# Patient Record
Sex: MALE | Race: WHITE
[De-identification: names, ages, dates, MRNs, and addresses within clinical notes are randomized per-mention and may not be internally consistent; named-entity substitution may affect disease eponyms.]

---

## 2017-08-21 ENCOUNTER — HOSPITAL ENCOUNTER (OUTPATIENT)
Dept: HOSPITAL 25 - OREAST | Age: 67
Discharge: HOME | End: 2017-08-21
Attending: ORTHOPAEDIC SURGERY
Payer: MEDICARE

## 2017-08-21 VITALS — DIASTOLIC BLOOD PRESSURE: 77 MMHG | SYSTOLIC BLOOD PRESSURE: 125 MMHG

## 2017-08-21 DIAGNOSIS — M23.201: ICD-10-CM

## 2017-08-21 DIAGNOSIS — Z85.46: ICD-10-CM

## 2017-08-21 DIAGNOSIS — M65.852: ICD-10-CM

## 2017-08-21 DIAGNOSIS — I10: ICD-10-CM

## 2017-08-21 DIAGNOSIS — M23.204: Primary | ICD-10-CM

## 2017-08-21 DIAGNOSIS — M19.90: ICD-10-CM

## 2017-08-21 NOTE — OP
DATE OF OPERATION:  08/21/17 - [facility]

 

DATE OF BIRTH:  [birth year].

 

SURGEON:  Kimberly Tinoco MD.

 

ASSISTANTS:  PETR Biswas.  Assistant was needed for the entirety of 
the case to help with positioning, retraction, and was utilized throughout all 
portions of the case.

 

ANESTHESIOLOGIST:  Dr. Ambrose.

 

ANESTHESIA:  General.

 

PRE-OP DIAGNOSIS:  Left knee medial meniscus tear.

 

POST-OP DIAGNOSIS:  Left knee medial meniscus tear as well as lateral meniscus 
tear and synovitis anteriorly, medially, and laterally.

 

OPERATIVE PROCEDURE:  Left knee arthroscopy with:

1.  Partial medial meniscectomy. cpt 85186

2.  Partial lateral meniscectomy. cpt 24456

3.  Medial femoral condyle chondroplasty. cpt 01962

4.  Synovectomy with plica excision of the anteromedial and anterolateral 
compartments.cpt 23811

5.  Intraarticular injection of 80 mg of Depo-Medrol.

 

COMPLICATIONS:  None.

 

ESTIMATED BLOOD LOSS:  Minimal.

 

INDICATIONS:  Junior Garcia is a 67-year-old gentleman who has had left knee 
pain for several months.  He has failed conservative management including 
physical therapy and injection.  He had an MRI recently that demonstrated a 
displaced medial meniscus tear.  After extensive discussion of the risks and 
benefits of operative versus nonoperative treatment and having inability of 
returning to his activities of daily living, he elected to proceed with knee 
arthroscopy with meniscus surgery. Risks and benefits were discussed at length 
and included, but are not limited to bleeding, infection, damage to nerves, 
vessels, surrounding structures, wound nonhealing, persistent pain, need for 
further surgery, risk of anesthesia, scarring, persistent pain, stiffness, 
incomplete relief of symptoms, need for further surgery, risk of DVT, and risk 
of anesthesia.

 

DESCRIPTION OF PROCEDURE:  The patient was greeted in the preoperative area by 
the attending surgeon.  The correct extremity was marked and consent was 
confirmed. The patient was brought back to the operative suite where he was 
placed in supine position on operating table.  He then underwent general 
anesthesia with LMA intubation and an unsterile tourniquet was placed high on 
the proximal thigh.  The lateral post was positioned.  The left leg was then 
prepped and draped in usual sterile fashion beginning with chlorhexidine soap 
scrub, and alcohol wipe, and a final prep with ChloraPrep.

 

After appropriate surgical pause indicating side, site, procedure, and 
administration of antibiotics, the standard james-lateral portal was made 
sharply with an 11 blade.  The scope was introduced through the joint.  The 
joint was examined.  There was abundant hyperemia and erythematous tissue 
present.  There was plicae along the medial aspect as well as laterally; they 
were very hyperemic.  The patellofemoral joint had grade 0 to 1 changes with no 
unstable flaps.  The medial femoral condyle had small areas of grade 1 to 2 
changes with small areas of mild fraying.  The scope was brought into the 
notch.  The ACL and PCL were visualized and had abundant erythema and 
inflammation.  The electrocautery device was used to help perform a synovectomy 
of the medial and lateral compartments.  Once the excess synovium was removed, 
attention was directed to the medial compartment.  There was again a small area 
of grade 1 to 2 changes, but the vast majority of the medial femoral condyle 
had grade 1 changes.  Medial tibial plateau had grade 1 changes. There was an 
unstable parrot beak tear of the medial meniscus apparent that was unstable.  
This was then debrided back using biters and darlyn with care try to protect 
the medial femoral condyle cartilage.  Once this was removed, attention was 
directed to the lateral compartment where there was mild fraying of the body of 
the meniscus.  This was debrided back using the shaver.  The lateral femoral 
condyle had grade 1 changes to the plateau and lateral femoral condyle.  The 
knee was then placed in full extension.  The remainder of the synovectomy was 
done even changing portals, so that there was less friction in the lateral 
femoral condyles.  The shaving device as well as electrocautery device was used 
to remove the excess bands of soft tissue.  Electrocautery was used to obtain 
hemostasis.  All fluid and debris was removed from the knee.  The knee was 
cycled several times and thoroughly lavaged.  Final images were obtained.  The 
wounds were then closed with 3-0 nylon. Sterile dressings were applied, 80 mg 
of Depo-Medrol and 30 mL of 0.25% of Marcaine plain was then injected 
intraarticularly into the knee.  A Cryo/Cuff was placed. He was awoken from 
anesthesia and transferred to PACU in stable condition.

 

POSTOPERATIVE PLAN:  He will be weightbearing as tolerated.  He will be allowed 
to work on range of motion immediately.  He will be discharged on pain 
medication.  He is unable to take antiinflammatories due to his GERD and so we 
will monitor him closely.  I will see him back in 10 to 14 days.

 

 [id number]/CPS #: [id number]

[facility]

## 2018-02-26 NOTE — HP
HISTORY AND PHYSICAL:

 

DATE OF ADMISSION:  03/01/18

 

DATE OF SURGERY:  03/01/18

 

DATE OF H AND P:  02/23/18

 

SURGEON:  Mary Jane King MD * (dictated by PETR Jimenez)

 

PROCEDURE:  Left total knee replacement.

 

CHIEF COMPLAINT:  Left knee pain.

 

HISTORY OF PRESENT ILLNESS:  Mr. Garcia is a 67-year-old gentleman with over 6 
months of severe left knee pain.  Six months ago, he underwent an arthroscopy 
with Dr. Tinoco for a meniscal tear and did well for a few weeks before 
developing increasing medial joint pain.  He did have an MRI at his last visit, 
which showed a large osteochondral defect of the medial femoral condyle.  This 
was discussed in his last appointment, it is concerning that the joint line 
breakdown rapidly over the next 6 to 12 months.  The patient is also unhappy 
with this current quality of life and chronic pain with chronic swelling and 
inability to participate in his hobbies and usual activities.  He has failed 
conservative treatment with anti- inflammatories, pain medications, intra-
articular injection, and physical therapy and would like to proceed with a left 
total knee replacement and presents today for an H and P of the same.

 

He did have preoperative clearance from his primary care physician.  He is also 
having a stress test with his cardiologist, Dr. Lee, on 02/26/18 prior to his 
surgery and will be seen on 02/27/18 by cardiology to discuss the results.

 

PAST MEDICAL HISTORY:  Significant for left knee arthritis and osteochondritis, 
left knee osteoarthritis, abnormal EKG history, spinal stenosis of the lumbar 
spine, hypertension, cardiomyopathy, prostate cancer, left bundle branch block.
  He denies any history of DVT or PE.

 

PAST SURGICAL HISTORY:  Significant for prostate surgery in 2003, lumbar spine 
surgery in 2016, left knee arthroscopy in August 2017.

 

MEDICATIONS:  He is currently taking metoprolol 50 mg daily losartan 15 mg 
daily.

 

ALLERGIES:

1.  LISINOPRIL.

2.  ALTACE.

 

FAMILY MEDICAL HISTORY:  Significant for a brother with lung cancer and a 
sister with brain cancer.  Father with prostate cancer and mother with a stroke.

 

SOCIAL HISTORY:  The patient lives with his wife.  He is retired  
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  He denies any tobacco or recreational 
drug use.  He drinks about 4 alcoholic beverages per week.  He is normally very 
active and likes to play golf.  He is right-hand dominant.

 

REVIEW OF SYSTEMS:  Positive for his presenting complaint.  It is negative for 
fevers, chills, chest pain, shortness of breath, nausea, vomiting, headache, 
dizziness as well as urinary urgency, palpitations.  A 14-point review of 
systems was negative.

 

                               PHYSICAL EXAMINATION

 

GENERAL:  The patient is a well-nourished, well-developed male, in no acute 
distress.  Alert and oriented x3 with pleasant mood and normal affect.  He has 
an antalgic gait favoring the left knee.

 

VITAL SIGNS:  Height 72 inches, pulse 62, blood pressure 138/82, respirations 16
, temperature 97.5.  Pain level 4/10.

 

HEENT:  Normocephalic, atraumatic.  Pupils are equally round and reactive to 
light and accommodation.  Extraocular movements are intact.

 

NECK:  Supple.  No palpable cervical lymph nodes.  Thyroid is smooth and 
nontender.

 

PULMONARY:  Lungs clear to auscultation bilaterally with no wheezes, rales, or 
rhonchi.

 

CARDIAC:  Regular rate and rhythm.  I did not appreciate any murmurs, rubs, or 
gallops.  He had no pedal edema.  2+ DP pulses bilaterally.

 

EXTREMITIES:  Left lower extremity, the patient's skin is intact.  No abrasions 
or open wound.  Moderate effusion at the knee joint.  Tenderness along the 
medial joint line of the knee.  No varus or valgus pain or instability.  Range 
of motion is from 10 to 120 degrees of flexion.  Negative Lachman, 5/5 ankle 
dorsiflexion and plantar flexion strength.  Full sensation to light touch in 
all nerve distributions, 2+ palpable DP pulse.

 

 STUDIES:  No new studies were collected today.  Previous x-ray showed 
degenerative changes with some joint space narrowing.  Subchondral sclerosis 
and mildly evident OCD along the medial femoral condyle, degenerative changes 
in the medial and patellofemoral compartments.  The MRI again showed an OCD to 
the effect of the medial femoral condyle.  Medial joint space and 
patellofemoral joint space showed degenerative changes.

 

IMPRESSION:  Mr. Garcia is a 67-year-old gentleman with medial femoral condyle 
OCD lesion as well as medial joint space and patellofemoral joint space 
narrowing.  He is an appropriate candidate for a left total knee arthroplasty.

 

PLAN:  Mr. Garcia is scheduled to undergo left total knee arthroplasty with Dr. King on 03/01/18.  He will return to clinic in about 10 days postop for 
followup and suture removal.  He will use Coumadin for DVT prophylaxis.  
Prescription for pain management will be prescribed prior to discharge from 
hospital and I-STOP will be checked prior to sending script.

 

 ____________________________________ PETR JIMENEZ

 

[id number]/CPS #: [id number]

[facility]

## 2018-02-26 NOTE — HP
H&P (Free Text)


History and Physical: 





Addendum to H&P by Bianka Jarrell PA-C from 2/23/18.





Dr. Vidales stated that due to scar tissue from previous prostate surgery, Dr. Vidales would start the ascencio catheter. Junior will have ascencio catheter 

placement by Dr. Vidales in the OR prior to left TKA on 3/1/18.

## 2018-03-01 ENCOUNTER — HOSPITAL ENCOUNTER (INPATIENT)
Dept: HOSPITAL 25 - AA | Age: 68
LOS: 2 days | Discharge: HOME HEALTH | DRG: 470 | End: 2018-03-03
Attending: ORTHOPAEDIC SURGERY | Admitting: ORTHOPAEDIC SURGERY
Payer: MEDICARE

## 2018-03-01 DIAGNOSIS — M93.862: ICD-10-CM

## 2018-03-01 DIAGNOSIS — K21.9: ICD-10-CM

## 2018-03-01 DIAGNOSIS — M25.462: ICD-10-CM

## 2018-03-01 DIAGNOSIS — Z90.79: ICD-10-CM

## 2018-03-01 DIAGNOSIS — I10: ICD-10-CM

## 2018-03-01 DIAGNOSIS — M22.42: ICD-10-CM

## 2018-03-01 DIAGNOSIS — Z80.42: ICD-10-CM

## 2018-03-01 DIAGNOSIS — Z80.8: ICD-10-CM

## 2018-03-01 DIAGNOSIS — Z88.8: ICD-10-CM

## 2018-03-01 DIAGNOSIS — Z82.3: ICD-10-CM

## 2018-03-01 DIAGNOSIS — I44.7: ICD-10-CM

## 2018-03-01 DIAGNOSIS — F41.9: ICD-10-CM

## 2018-03-01 DIAGNOSIS — I48.91: ICD-10-CM

## 2018-03-01 DIAGNOSIS — G89.29: ICD-10-CM

## 2018-03-01 DIAGNOSIS — Z80.1: ICD-10-CM

## 2018-03-01 DIAGNOSIS — I42.8: ICD-10-CM

## 2018-03-01 DIAGNOSIS — M25.762: ICD-10-CM

## 2018-03-01 DIAGNOSIS — M17.12: Primary | ICD-10-CM

## 2018-03-01 DIAGNOSIS — Z85.46: ICD-10-CM

## 2018-03-01 DIAGNOSIS — I77.819: ICD-10-CM

## 2018-03-01 DIAGNOSIS — Z72.89: ICD-10-CM

## 2018-03-01 LAB
BASOPHILS # BLD AUTO: 0 10^3/UL (ref 0–0.2)
EOSINOPHIL # BLD AUTO: 0 10^3/UL (ref 0–0.6)
HCT VFR BLD AUTO: 49 % (ref 42–52)
HGB BLD-MCNC: 16.9 G/DL (ref 14–18)
LYMPHOCYTES # BLD AUTO: 0.8 10^3/UL (ref 1–4.8)
MCH RBC QN AUTO: 32 PG (ref 27–31)
MCHC RBC AUTO-ENTMCNC: 34 G/DL (ref 31–36)
MCV RBC AUTO: 93 FL (ref 80–94)
MONOCYTES # BLD AUTO: 0.1 10^3/UL (ref 0–0.8)
NEUTROPHILS # BLD AUTO: 7.6 10^3/UL (ref 1.5–7.7)
NRBC # BLD AUTO: 0 10^3/UL
NRBC BLD QL AUTO: 0.1
PLATELET # BLD AUTO: 183 10^3/UL (ref 150–450)
RBC # BLD AUTO: 5.29 10^6/UL (ref 4–5.4)
WBC # BLD AUTO: 8.5 10^3/UL (ref 3.5–10.8)

## 2018-03-01 PROCEDURE — 85049 AUTOMATED PLATELET COUNT: CPT

## 2018-03-01 PROCEDURE — 94760 N-INVAS EAR/PLS OXIMETRY 1: CPT

## 2018-03-01 PROCEDURE — C1776 JOINT DEVICE (IMPLANTABLE): HCPCS

## 2018-03-01 PROCEDURE — 85610 PROTHROMBIN TIME: CPT

## 2018-03-01 PROCEDURE — 85014 HEMATOCRIT: CPT

## 2018-03-01 PROCEDURE — 87641 MR-STAPH DNA AMP PROBE: CPT

## 2018-03-01 PROCEDURE — 93005 ELECTROCARDIOGRAM TRACING: CPT

## 2018-03-01 PROCEDURE — 36415 COLL VENOUS BLD VENIPUNCTURE: CPT

## 2018-03-01 PROCEDURE — 80048 BASIC METABOLIC PNL TOTAL CA: CPT

## 2018-03-01 PROCEDURE — 88305 TISSUE EXAM BY PATHOLOGIST: CPT

## 2018-03-01 PROCEDURE — 0SRD069 REPLACEMENT OF LEFT KNEE JOINT WITH OXIDIZED ZIRCONIUM ON POLYETHYLENE SYNTHETIC SUBSTITUTE, CEMENTED, OPEN APPROACH: ICD-10-PCS | Performed by: ORTHOPAEDIC SURGERY

## 2018-03-01 PROCEDURE — 85025 COMPLETE CBC W/AUTO DIFF WBC: CPT

## 2018-03-01 PROCEDURE — 88311 DECALCIFY TISSUE: CPT

## 2018-03-01 PROCEDURE — 85018 HEMOGLOBIN: CPT

## 2018-03-01 PROCEDURE — 85730 THROMBOPLASTIN TIME PARTIAL: CPT

## 2018-03-01 PROCEDURE — 80053 COMPREHEN METABOLIC PANEL: CPT

## 2018-03-01 PROCEDURE — 83735 ASSAY OF MAGNESIUM: CPT

## 2018-03-01 RX ADMIN — MAGNESIUM HYDROXIDE SCH ML: 400 SUSPENSION ORAL at 21:14

## 2018-03-01 RX ADMIN — DOCUSATE SODIUM SCH MG: 100 CAPSULE, LIQUID FILLED ORAL at 21:14

## 2018-03-01 RX ADMIN — CEFAZOLIN SCH MLS/HR: 1 INJECTION, POWDER, FOR SOLUTION INTRAVENOUS at 21:10

## 2018-03-01 NOTE — RAD
HISTORY: Status post left knee arthroplasty



COMPARISONS: October 20, 2017



VIEWS: 2, Frontal and lateral views of the left knee



FINDINGS:



BONE DENSITY: Normal.

BONES: The patient is status post left knee arthroplasty. There is no appreciable hardware

failure or osteolysis.    

JOINTS: The patient is status post left knee arthroplasty.    

ALIGNMENT: There is no dislocation. 

SOFT TISSUES: There is post surgical change to the soft tissue.



OTHER FINDINGS: None.



IMPRESSION: 

STATUS POST LEFT KNEE ARTHROPLASTY

## 2018-03-01 NOTE — CONS
CC:  Mary Jane King MD; Adelfo Erazo NP *

 

CARDIOLOGY CONSULTATION:

 

DATE OF CONSULTATION:  03/01/18

 

REFERRAL PHYSICIAN:  Mr. Stanton Traore and Dr. Teresa Russell.

 

REASON FOR CARDIOLOGY CONSULTATION:  New-onset atrial fibrillation noted 
postoperatively from left knee replacement earlier today.

 

HISTORY OF PRESENT ILLNESS:  The patient is known to me with a history of mild-
to- moderate nonischemic cardiomyopathy.  Today, apparently he went into rapid 
atrial fibrillation following his left knee replacement.  The case was 
discussed by myself with the hospitalist service and we began him on esmolol 
drip with titration to keep heart rate less than 100.  The patient has now 
converted back to sinus rhythm and I am seeing him approximately  less than 6 
hours since his surgery.  The patient is still recovering from general 
anesthesia, but is alert enough that he can tell me he does not have any chest 
pain and that his breathing feels well.  Interestingly enough, he has noted 
some palpitations at night in the past, but they seemed to occur when he drinks 
cold liquids and he has not had any palpitations at night for 2 to 3 months. It 
sounds to me like when he recovered from anesthesia, he was back in sinus 
rhythm.

 

PAST CARDIAC HISTORY:  Includes nonischemic cardiomyopathy.  We admitted the 
patient with a history of left bundle branch block.  His last echocardiogram 
completed 02/14/18 showed normal left ventricular size with mild-to-moderately 
depressed left ventricular ejection fraction of 40% to 45% with impaired 
diastolic relaxation, mild concentric left ventricular hypertrophy, mild-to-
moderate global hypokinesis, mild right atrial enlargement, functionally benign 
heart valves, and mildly dilated ascending aorta.  When compared to prior 
echocardiogram completed 06/05/17, there is no significant change.  He 
completed a cardiac chemical nuclear stress test 02/26/18, which was abnormal. 
While it showed no evidence of ischemia, no infarct, convincing evidence of 
infarction, there was mild-to-moderately depressed left ventricular function 
again seen at 40% to 45%, felt to be an intermediate risk study not 
significantly changed from prior cardiac chemical nuclear stress test, 04/11/
16.  He has a history of chronic left bundle branch block, osteochondritis, 
dissecans plica syndrome, his left knee status post replacement earlier today 
with OA, synovial cyst, spinal stenosis, chest and cardiomyopathy in the past.  
The patient states he no longer has hypertension. The patient does have a 
history of hypertension.

 

PAST MEDICAL HISTORY:  The patient denies history of stroke, congestive heart 
failure, diabetes.  He does have a history of hypertension, now treated, and 
his age is 67.  No history of vascular surgery.  CHADs-VASc score is +2.

 

OUTPATIENT MEDICATIONS:

1.  Cozaar 50 mg once a day.

2.  Toprol XL 50 mg once a day.

 

ALLERGIES TO MEDICATIONS:  LISINOPRIL and ALTACE.  He denies shrimp, sea food, 
or dye allergies.

 

FAMILY HISTORY:  Unable to obtain as the patient is recovering from anesthesia.

 

SOCIAL HISTORY:  Unable to obtain as the patient is recovering from anesthesia.

 

REVIEW OF SYSTEMS:  Unable to obtain as the patient is recovering from 
anesthesia.

 

PHYSICAL EXAMINATION:  The patient's height 6 feet, weight 198 pounds.  Blood 
pressure 118/76.  Pulse is currently 82, had previously been 131 when he was in 
rapid atrial fibrillation, temperature 97.1 degrees Fahrenheit, O2 saturation 97
%. On general exam, he is pleasant gentleman in no acute distress at rest, 
recovering from anesthesia.  HEENT:  Shows the cranium is normocephalic and 
atraumatic.  He has dry mucosal membranes.  Neck veins are not distended.  
There are no carotid bruits visible.  Skin warm and perfused.  The affect is 
appropriate.  He appears oriented.  No significant kyphoscoliosis on back exam.
  Lungs:  Clear to auscultation.  No wheezes, no rales.  Cardiac Exam:  S1, S2.
  Regular rate.  No significant murmurs, rubs or gallops.  PMI is nondisplaced.
  Abdomen:  Soft, nondistended.  Appears benign.  Extremities with trivial 
peripheral edema.  Pulses appear grossly intact.

 

DIAGNOSTIC STUDIES/LABORATORY DATA:  A 12-lead EKG reviewed from earlier today, 
03/01/18 at 1231 shows atrial fibrillation at 112 beats per minute, left bundle 
branch block.  Telemetry now clearly demonstrates sinus rhythm.  He had a 
repeat EKG at 1455, which shows sinus rhythm with left bundle branch block and 
left axis deviation.  Most recent echocardiogram and cardiac chemical nuclear 
stress test is as above.

 

Sodium 137, potassium 4.3, chloride 105, bicarbonate 24, BUN 15, creatinine 1, 
ALT 25, magnesium 1.9.  White blood cell count 8.5, hematocrit 49, platelet 
count 183.

 

IMPRESSION:  Mr. Garcia is a pleasant 67-year-old gentleman with a history of 
mild- to-moderate nonischemic cardiomyopathy, treated hypertension; and chronic 
left bundle branch block with left knee replacement earlier today with 
transient postoperative rapid atrial fibrillation, which has converted since 
the use of esmolol drip.  He is feeling well at this time.  I have discussed 
this in detail with the patient, his wife, Patricia, and son at the patient's 
bedside with the patient's verbal consent and he is in agreement with the 
following recommendations.

 

RECOMMENDATIONS:

1.  We will increase his baseline Toprol XL to 75 mg once a day and wean off 
the esmolol drip.

2.  The patient will be on Coumadin for his left knee replacement for some 
period of time.  While we acknowledge that his CHADs-VASc score strictly 
speaking is 2, his prior brief AF episode appears to be a discrete event with 
an identifiable etiology ie his LKR, so I think it is reasonable once he is off 
the Coumadin to simply place him on aspirin 81 mg once a day unless he would 
have recurrent atrial fibrillation.

3.  We have again advised the patient to avoid alcohol and caffeine. Per the 
patient's description, he does not drink excessive amounts of alcohol, "only 1 
to 2 drinks on Sundays".

4.  Recommend keep magnesium greater than 2.

5.  Would recommend checking baseline TSH.

6.  Further management as per the hospitalist medicine service and I have 
discussed the case with  León of the hospitalist medicine service.

7.  The patient should follow up with myself in 4 to 6 weeks following 
discharge.

 

Dear, Mr. Stanton Traore, many thanks for this kind cardiovascular consultation 
opportunity.  Please do not hesitate to contact me if you any questions or 
concerns in regard to the patient's cardiovascular consultative care.

 

 [id number]/CPS #: [id number]

[facility]

## 2018-03-02 LAB
HCT VFR BLD AUTO: 42 % (ref 42–52)
HGB BLD-MCNC: 14.5 G/DL (ref 14–18)
INR PPP/BLD: 0.97 (ref 0.77–1.02)
PLATELET # BLD AUTO: 199 10^3/UL (ref 150–450)

## 2018-03-02 RX ADMIN — METOPROLOL SUCCINATE SCH MG: 50 TABLET, EXTENDED RELEASE ORAL at 08:03

## 2018-03-02 RX ADMIN — MAGNESIUM HYDROXIDE SCH: 400 SUSPENSION ORAL at 19:39

## 2018-03-02 RX ADMIN — DOCUSATE SODIUM SCH MG: 100 CAPSULE, LIQUID FILLED ORAL at 19:24

## 2018-03-02 RX ADMIN — THERA TABS SCH TAB: TAB at 08:02

## 2018-03-02 RX ADMIN — CEFAZOLIN SCH: 1 INJECTION, POWDER, FOR SOLUTION INTRAVENOUS at 01:00

## 2018-03-02 RX ADMIN — MAGNESIUM HYDROXIDE SCH ML: 400 SUSPENSION ORAL at 08:02

## 2018-03-02 RX ADMIN — CEFAZOLIN SCH MLS/HR: 1 INJECTION, POWDER, FOR SOLUTION INTRAVENOUS at 05:22

## 2018-03-02 RX ADMIN — OXYCODONE HYDROCHLORIDE PRN MG: 5 CAPSULE ORAL at 01:57

## 2018-03-02 RX ADMIN — OXYCODONE HYDROCHLORIDE AND ACETAMINOPHEN PRN TAB: 5; 325 TABLET ORAL at 16:04

## 2018-03-02 RX ADMIN — OXYCODONE HYDROCHLORIDE PRN MG: 5 CAPSULE ORAL at 19:24

## 2018-03-02 RX ADMIN — CEFAZOLIN SCH MLS/HR: 1 INJECTION, POWDER, FOR SOLUTION INTRAVENOUS at 13:05

## 2018-03-02 RX ADMIN — DOCUSATE SODIUM SCH MG: 100 CAPSULE, LIQUID FILLED ORAL at 08:02

## 2018-03-02 RX ADMIN — ENOXAPARIN SODIUM SCH MG: 40 INJECTION SUBCUTANEOUS at 08:02

## 2018-03-02 RX ADMIN — OXYCODONE HYDROCHLORIDE AND ACETAMINOPHEN PRN TAB: 5; 325 TABLET ORAL at 11:51

## 2018-03-02 NOTE — PN
Progress Note





- Progress Note


Date of Service: 03/02/18


SOAP: 


Subjective:


Pt. is alert, denies chest pain or palpitations.  L knee pain moderate.  








Objective:


LLE - drain removed, tip intact with 300 cc ss drainage.  Dressing c/d/i.  

distally nvi.  





Vital Signs:











Temp Pulse Resp BP Pulse Ox


 


 99.1 F   60   20   109/67   95 


 


 03/02/18 04:00  03/02/18 06:00  03/02/18 06:00  03/02/18 06:00  03/02/18 06:00








 Laboratory Results - last 24 hr











  03/01/18 03/01/18 03/01/18





  13:20 13:20 13:20


 


WBC    8.5


 


RBC    5.29


 


Hgb    16.9


 


Hct    49


 


MCV    93


 


MCH    32 H


 


MCHC    34


 


RDW    13


 


Plt Count    183


 


MPV    8


 


Neut % (Auto)    89.5 H


 


Lymph % (Auto)    9.1 L


 


Mono % (Auto)    1.1


 


Eos % (Auto)    0.1


 


Baso % (Auto)    0.2


 


Absolute Neuts (auto)    7.6


 


Absolute Lymphs (auto)    0.8 L


 


Absolute Monos (auto)    0.1


 


Absolute Eos (auto)    0


 


Absolute Basos (auto)    0


 


Absolute Nucleated RBC    0


 


Nucleated RBC %    0.1


 


INR (Anticoag Therapy)   


 


APTT   31.3 


 


Sodium  137  


 


Potassium  4.3  


 


Chloride  105  


 


Carbon Dioxide  24  


 


Anion Gap  8  


 


BUN  15  


 


Creatinine  1.00  


 


Est GFR ( Amer)  95.3  


 


Est GFR (Non-Af Amer)  74.1  


 


BUN/Creatinine Ratio  15.0  


 


Glucose  194 H  


 


Calcium  8.9  


 


Magnesium  1.9  


 


Total Bilirubin  0.50  


 


AST  20  


 


ALT  25  


 


Alkaline Phosphatase  54  


 


Total Protein  6.3 L  


 


Albumin  3.8  


 


Globulin  2.5  


 


Albumin/Globulin Ratio  1.5  


 


TSH  Cancelled  














  03/02/18 03/02/18 03/02/18





  05:09 05:09 05:09


 


WBC   


 


RBC   


 


Hgb   14.5 


 


Hct   42 


 


MCV   


 


MCH   


 


MCHC   


 


RDW   


 


Plt Count   199 


 


MPV   8 


 


Neut % (Auto)   


 


Lymph % (Auto)   


 


Mono % (Auto)   


 


Eos % (Auto)   


 


Baso % (Auto)   


 


Absolute Neuts (auto)   


 


Absolute Lymphs (auto)   


 


Absolute Monos (auto)   


 


Absolute Eos (auto)   


 


Absolute Basos (auto)   


 


Absolute Nucleated RBC   


 


Nucleated RBC %   


 


INR (Anticoag Therapy)    0.97


 


APTT   


 


Sodium  137  


 


Potassium  4.8  


 


Chloride  106  


 


Carbon Dioxide  27  


 


Anion Gap  4  


 


BUN  15  


 


Creatinine  1.00  


 


Est GFR ( Amer)  95.3  


 


Est GFR (Non-Af Amer)  74.1  


 


BUN/Creatinine Ratio  15.0  


 


Glucose  139 H  


 


Calcium  8.7  


 


Magnesium  2.0  


 


Total Bilirubin   


 


AST   


 


ALT   


 


Alkaline Phosphatase   


 


Total Protein   


 


Albumin   


 


Globulin   


 


Albumin/Globulin Ratio   


 


TSH   

















Assessment:


68 yo M pod 1 s/p LTKA with new onset afib immediately postop








Plan:


Appreciate hospitalist and cardiology guidance/consult


Will continue coumadin with lovenox bridge


Plan transfer to [facility] today if rate is sinus and drips are d/c'ed


PT/OT


10 mg coumadin tonight, lovenox today

## 2018-03-02 NOTE — OP
OPERATIVE REPORT:

 

DATE OF OPERATION:  03/01/18

 

DATE OF BIRTH:  [birth year]

 

SURGEON:  Mary Jane King MD.

 

ASSISTANT:  PETR Stoll Ms. did help throughout the procedure with preparation of the leg, wound retraction and joselito
pulation of the knee.

 

ANESTHESIOLOGIST:  Dr. Jose.

 

ANESTHESIA:  General with adductor nerve block.

 

PRE-OP DIAGNOSIS:  Severe end-stage degenerative osteoarthritis of the left knee joint.

 

POST-OP DIAGNOSIS:  Severe end-stage degenerative osteoarthritis of the left knee joint.

 

OPERATIVE PROCEDURE:  Left total knee arthroplasty.

 

TOURNIQUET TIME:  61 minutes.

 

COMPLICATIONS:  None.

 

SPECIMENS:  Bone and cartilage from the left knee joint sent to Pathology.

 

HARDWARE USED:  This is cemented Smith and Nephew total knee arthroplasty hardware. Two packages of s
implex bone cement were used.  For the femur, a size 7 left posterior stabilized Legion Oxinium femor
al component.  For the tibia, a size 6 left tibial base plate April II.  For the insert, a 9 mm Gen
esis II posterior stabilized high flexion articular insert, size 5/6.  For the patella a 38 mm 3-peg 
all poly patella.

 

BRIEF HISTORY/INDICATION:  Mr. Garcia is a 67-year-old gentleman with increasingly severe left knee pa
in over the last year.  He failed conservative treatment with anti-inflammatories, pain medications, 
physical therapy, injections and arthroscopy.  At the time of arthroscopy he was noted to have the ad
vanced arthritis of the medial and patellar femoral compartment.  MRI also showed it to be advanced a
rthritis.  The patient elected to undergo left total knee arthroplasty due to continued pain and decr
eased quality of life.

 

Informed consent was obtained from the patient.  He understood the risks of surgery included, but wer
e not limited to bleeding, infection, damage to nearby structures, continued pain, need for further s
urgery, intraoperative fracture, nerve palsy, hardware failure or loosening, knee stiffness, loss of 
motion, stroke, heart attack, blood clot, and death.  He wished to proceed.

 

INTRAOPERATIVE FINDINGS:  Intraoperatively, the patient was noted to have severe chondromalacia and l
oss of cartilage in both the medial and patellofemoral compartments.

 

DESCRIPTION OF PROCEDURE:  Mr. Garcia was identified in the preanesthesia unit.  His left lower extrem
ity was marked as the correct operative side.  Informed consent was signed and placed in the chart.  
The patient was taken to the operating room and placed under general anesthesia with an adductor nerv
e block.  A Galvan catheter was placed by Dr. Henok Benz of Urology.  The patient had a tourniquet 
placed on the left thigh.  The left lower extremity was prepped and draped in the usual sterile fashi
on.  Preop time-out was made to correctly identify the patient's side and site.  Appropriate perioper
ative antibiotics were given within 1 hour of incision.

 

Tourniquet was inflated and total tourniquet time for this procedure was 61 minutes.  A midline incis
ion was made with a 10-blade and carried down to the extensor mechanism.  A new 10-blade was used to 
make a standard medial parapatellar arthrotomy.  Patella was subluxed laterally.  Electrocautery was 
used to subperiosteally elevate soft tissue off the superomedial tibia to the mid sagittal plane.  Th
e femur was flexed up.  The anterior horn of the lateral meniscus and ACL were sharply released.  It 
was noted right away significant cartilage wear and loss along the medial femoral condyle.  A drill w
as used to enter the distal femur. Intramedullary distal femoral cutting guide was pinned on the dist
al femur. Oscillating saw was used to make the appropriate distal femoral cut.  Next the external rot
ation guide was pinned on the distal femur and the femur was sized to a size 7.  A size 7 multi-cutti
ng jig was pinned on the distal femur.  Oscillating saw was used to make the appropriate 4 chamfer cu
ts.

 

Next the PCL was completely released.  The tibia was subluxed anteriorly. Extramedullary proximal tib
ial cutting guide was pinned on the proximal tibia.  The oscillating saw was used to the make the pro
ximal tibial cut perpendicular to the mechanical axis of the tibia.  The bone was carefully removed. 
 The knee was brought out into full extension.  A spacer block had excellent fit.  There was good med
ial and lateral ligamentous balancing.  Flexion and extension gaps were well balanced.

 

The knee was flexed up and the lamina  was placed both medially and laterally.  Any remaining
 meniscus was carefully removed using an electrocautery. Posterior osteophytes were removed using a c
urved osteotome.  Tibial tray and drop micah confirmed satisfactory tibial cut.

 

A size 7 left femoral trial was impacted on to the distal femur.  The box for the posterior stabilize
d implant was prepared using a reamer and box cut osteotome.  A size 6 tibial tray trial with a 9-mm 
insert trial was placed and the knee was taken through a range of motion.  The knee had full extensio
n to 130 degrees of flexion. There was satisfactory patellofemoral tracking.

 

The patella was everted.  9 mm of patellar bone and cartilage was carefully removed using an oscillat
ing saw.  There was extensive cartilage loss and chondromalacia noted.  The patella was sized to a si
ze 38.  The pegs were drilled through the size 38 guide.  The trial 38 was placed and the knee was ta
bren through range of motion. There was satisfactory patellofemoral tracking.  All trials were careful
ly removed. The tibia was subluxed anteriorly and sized to a size 6.  Proximal tibia was prepared usi
ng a size 6 keel punch.  All bony cut surfaces were copiously irrigated with sterile saline and dried
.  Final implants were cemented into place starting with the tibia followed by the femur and last the
 patella.  A 9-mm insert trial was placed while the knee was brought out into full extension.  The to
urniquet was turned down at 61 minutes.  The knee was copiously irrigated with sterile saline. Electr
ocautery was used to obtain meticulous hemostasis.  Once the cement had fully cured, the insert trial
 was removed.  Final insert chosen was a size 5/6, 9-mm posterior stabilized, high flexion insert; th
is was locked into position on the tibial tray.  Insert was checked and rechecked and noted to be sta
ble.

 

The knee was once again copiously irrigated with sterile saline.  The extensor mechanism was closed o
karly a medium Hemovac drain using interrupted #1 Vicryls.  The rest of the incision was closed in a la
yered fashion using 0 and 2-0 Vicryls.  The skin was closed using running 3-0 nylon suture.

 

The patient's incision was covered with Xeroform, 4x4s, and Webril.  Ace wrap and cold pack were plac
ed over this.

 

The patient's anesthesia was reversed without difficulty.  He was taken to the PACU in stable conditi
on.  Intended weightbearing will be weightbearing as tolerated. Intended DVT prophylaxis will be Coum
tiara with a Lovenox bridge.

 

 [id number]/[facility] #: [id number]

## 2018-03-02 NOTE — CONS
CC:  Dr. Mary Jane King; Adelfo Erazo NP. *

 

CONSULTATION REPORT:

 

DATE OF ADMISSION:  18

 

REQUESTING PROVIDER:  Dr. Mary Jane King.

 

MY ATTENDING WHILE IN THE HOSPITAL:  Dr. Delores Ordonez.

 

PRIMARY CARE DOCTOR:  Adelfo Erazo NP

 

REASON FOR CONSULTATION:  Comanagement of comorbid medical conditions.

 

HISTORY OF PRESENT ILLNESS:  Mr. Garcia is a 67-year-old male with past medical 
history significant for hypertension, nonischemic cardiomyopathy, osteoarthritis
, left bundle-branch block and prostate cancer, who is in the hospital for a 
left total knee arthroplasty, which was performed on 18 with no 
complications. The patient having EBL of 250, general endotracheal intubation 
with a nerve block and had an EBL of 250.  The patient is examined after he 
returned to floor.  The patient has no complaints at this time.  No chest pain, 
shortness of breath, dizziness, palpitations.  Pain is 2/10 only in his knee.  
No other pain.  No recent illnesses, fevers, chills, nausea, vomiting, changes 
in his vision, headaches.  The patient has no recent exposure to anybody who is 
sick or anyone with the flu.  The patient had a Galvan placed by Dr. Henok Benz of Urology without difficulty in the preoperative period and his note 
indicates it can be removed anytime.  The patient had preoperative clearance 
through his outpatient cardiologist, Dr. Kamaljit Lee, which had an intermediate 
risk, which included an echocardiogram, which showed an EF of 40% to 45%, which 
is consistent with previous exams and a stress test, which showed no areas of 
ischemia, but decreased ejection fraction is read as intermediate risk and was 
deemed appropriate for this surgery.  The patient on his postoperative vitals 
was found to have a heart rate in the 110s and EKG was performed and patient 
was found to be in AFib for which he has no past history.

 

PAST MEDICAL HISTORY:  Cardiomyopathy, nonischemic, EF of 40% to 45%; prostate 
cancer, status post prostatectomy; hypertension; spinal stenosis, status post 
surgery; left knee arthritis and osteochondritis; left bundle-branch block.

 

PAST SURGICAL HISTORY:  Prostate surgery in , lumbar spine surgery in , 
left knee arthroscopy in 2017.

 

MEDICATIONS:

1.  Toprol 50 mg p.o. daily.

2.  Losartan 50 mg p.o. daily.

3.  Tylenol 500 mg p.o. daily as needed for pain.

 

ALLERGIES:  LISINOPRIL, RAMIPRIL.

 

FAMILY HISTORY:  The patient has no family history of atrial fibrillation.  The 
patient's family history is significant for brother with lung cancer and sister 
with brain cancer when they were young.  The patient's father had prostate 
cancer and  of complications of this and his mother had a stroke.

 

SOCIAL HISTORY:  The patient lives with his wife.  The patient denies tobacco 
or recreational drug use.  He drinks 4 alcoholic beverages a week.  He is 
active.  He is retired  at [employer].  His healthcare proxy 
would be his wife Patricia.

 

REVIEW OF SYSTEMS:  A 14-point review of systems was reviewed and is negative 
except as above.

 

PHYSICAL EXAM:  General:  The patient is a 67-year-old male who appears stated 
age and sitting comfortably on bed in no acute distress.  Vital Signs:  
Temperature 97.9, pulse rate 123, respiratory rate 16, oxygen saturation 98% on 
3 L, blood pressure 120/85.  HEENT:  Head:  Normocephalic, atraumatic.  Sclerae 
anicteric.  No conjunctival injection.  Nasal mucosa moist.  Oral mucosa moist.
  There is pallor of the oral mucosa.  No pharyngeal erythema, discharge, or 
exudate.  Neck:  Supple, nontender.  No lymphadenopathy.  No carotid bruit 
auscultated.  No JVD.  Cardiac: Irregularly irregular rhythm, rate 110.  No 
clicks, murmurs, gallops, or rubs. Pulses are 2+ in bilateral dorsalis pedis, 
posterior tibialis, and radial areas. No lower extremity edema noted.  
Respiratory:  Clear to auscultation bilaterally. No wheezes, rales, or rhonchi.
  Good air exchange bilaterally.  Abdomen:  Soft, nontender, and nondistended.  
Bowel sounds present and normoactive in all 4 quadrants.  No 
hepatosplenomegaly.  No abdominal bruits auscultated. Genitourinary:  The 
patient has a Galvan catheter draining dark landy clear urine. No CVA 
tenderness.  Skin:  Clean, dry, and intact.  No rash.  The patient's surgical 
incision on his left knee is covered by a bulky dressing.  The patient's left 
leg is colder than his right.  Neuro:  Cranial nerves II through XII intact. No 
numbness or tingling in the hands or feet.  No focal deficits.  Alert and 
oriented x3.  Psychiatric:  Pleasant and cooperative.

 

PREOPERATIVE LABORATORY DATA:  White blood cell count 7.6, hemoglobin 18.2, 
hematocrit 52%, MCH 32, MCV 93, platelet count 218.  INR 0.92, APTT 31.5.  
Sodium 141, potassium 4.3, chloride 103, carbon dioxide 31, anion gap 7, BUN 16
, creatinine 1.08, glucose 99, hemoglobin A1c 5.5, calcium 9.8.  Total 
bilirubin 0.6, AST 18, ALT 23, alkaline phosphatase 54, total protein 6.6, 
albumin 4.3, globulin 2.3.  Triglycerides 173, cholesterol 177, LDL cholesterol 
96, HDL 46.8.  PSA 0.4. TSH 2.3.  Of note, the hemoglobin A1c and lipid panel 
are not up-to-date.

 

DIAGNOSTIC STUDIES:  Knee x-ray is an intraoperative test film.  
Electrocardiogram shows left bundle-branch block consistent with previous 
exams.  Anterior ST segments are uninterpretable.  Irregularly irregular 
rhythm.  No P waves present. This is significantly different from his test strip
, which was obtained at the post acute care unit.  QTc of 522.

 

ASSESSMENT AND PLAN:  Impression:

 

Mr. Garcia is a 67-year-old male with past medical history significant for non- 
ischemic cardiomyopathy, hypertension, who is status post total left knee 
arthroplasty, who was found to be in atrial fibrillation with a rapid rate 
after surgery.  This case was discussed with patient's cardiologist, Dr. Kamaljit eLe, who is on-call today and he suggested that he be in the intensive care 
unit with an esmolol drip and a heparin drip which was cleared by Dr. King of 
Orthopedics.

 

1.  Atrial fibrillation with rapid ventricular rate.  We will treat with 
metoprolol 5 mg IV up to 3 doses at least 5 minutes apart at this time 
monitoring for hemodynamic instability.  The patient is currently asymptomatic 
making it possible to note if patient has been in and out of atrial 
fibrillation if preoperatively. This case has been discussed with Dr. Kamaljit Lee who will see the patient and consider cardioversion in the morning.  The 
patient will be started on a heparin drip, which has been okayed by his 
orthopedist, Dr. King.  The patient was started on esmolol drip without 
boluses due to metoprolol infusions.  The patient will be monitored in the 
intensive care unit for hypotension.  We will repeat EKG in the morning to 
assess for spontaneous cardioversion, if not, the patient may be cardioverted 
at the discretion of Cardiology.  The plan was for patient to be started on 
warfarin for postoperative DVT prophylaxis. Patient will be started on a 
heparin drip at this time which has been approved by his surgeon. Warfarin will 
be continued at this time whether or not patient remains in atrial fibrillation
, his CHADS2 VASc score is 3, and he warrants therapeutic anti-coagulation and 
should be anticoagulated for his knee surgery anyway.  Whether or not to 
continue this anticoagulation will be left to the discretion of his outpatient 
cardiologist and primary care provider.

2.  History of prostate cancer.  The patient had a Galvan inserted 
preoperatively with Dr. Henok Benz, which is producing clear urine without 
blood.  Dr. Benz indicated this Galvan can be removed  anytime 
postoperatively.  The patient had a PSA, which was within normal limits 
preoperatively.

3.  Status post total left knee arthroplasty.  The patient's pain is well 
controlled at this time.  The patient had bowel movement.  The patient denies 
abdominal pain.  The patient is not passing flatus.  The patient has the Galvan 
inserted.  Management per primary team.

4.  Hypertension.  We will hold patient's metoprolol and lisinopril while he is 
on the esmolol drip and we will monitor vital signs closely for hypotension.  
We will continue fluids per Surgery at 100 mL an hour.

5.  Non-Ischemic Cardiomyopathy. This is stable and believed to be related to 
hypertension. Monitor strict I/O and Daily Weights.

5.  FEN:  The patient will have a heart-healthy diet.  The patient will be 
n.p.o. after midnight for possible cardioversion in the morning.

6.  DVT prophylaxis:  The patient will be on heparin drip and we will start 
warfarin as per Ortho.

7.  Disposition:  The patient will be transferred to the ICU.

 

TIME SPENT:  Approximately 90 minutes was spent on this consultation, 30 of 
which was spent face-to-face with the patient obtaining history and physical 
and discussing treatment plan.

 

This plan has been discussed with my attending, Dr. Delores Ordonez, and she is 
in agreement.

 

____________________________________ PETR CAMPBELL

 

[id number]/[facility] #: [id number]

[facility]

## 2018-03-03 VITALS — DIASTOLIC BLOOD PRESSURE: 66 MMHG | SYSTOLIC BLOOD PRESSURE: 137 MMHG

## 2018-03-03 LAB
HCT VFR BLD AUTO: 39 % (ref 42–52)
HGB BLD-MCNC: 13.6 G/DL (ref 14–18)
INR PPP/BLD: 1.31 (ref 0.77–1.02)
PLATELET # BLD AUTO: 185 10^3/UL (ref 150–450)

## 2018-03-03 RX ADMIN — THERA TABS SCH TAB: TAB at 07:53

## 2018-03-03 RX ADMIN — DOCUSATE SODIUM SCH MG: 100 CAPSULE, LIQUID FILLED ORAL at 07:53

## 2018-03-03 RX ADMIN — OXYCODONE HYDROCHLORIDE PRN MG: 5 CAPSULE ORAL at 07:54

## 2018-03-03 RX ADMIN — ENOXAPARIN SODIUM SCH MG: 40 INJECTION SUBCUTANEOUS at 07:56

## 2018-03-03 RX ADMIN — METOPROLOL SUCCINATE SCH MG: 50 TABLET, EXTENDED RELEASE ORAL at 07:54

## 2018-03-03 RX ADMIN — MAGNESIUM HYDROXIDE SCH: 400 SUSPENSION ORAL at 08:07

## 2018-03-03 RX ADMIN — OXYCODONE HYDROCHLORIDE AND ACETAMINOPHEN PRN TAB: 5; 325 TABLET ORAL at 00:23

## 2018-03-03 RX ADMIN — OXYCODONE HYDROCHLORIDE AND ACETAMINOPHEN PRN TAB: 5; 325 TABLET ORAL at 05:56

## 2018-03-03 RX ADMIN — OXYCODONE HYDROCHLORIDE PRN MG: 5 CAPSULE ORAL at 02:57

## 2018-03-03 RX ADMIN — OXYCODONE HYDROCHLORIDE AND ACETAMINOPHEN PRN TAB: 5; 325 TABLET ORAL at 12:18

## 2018-03-03 NOTE — PN
Progress Note





- Progress Note


Date of Service: 03/03/18


SOAP: 


Subjective:


POD #2 Left TKA. Doing well. Pain controlled with meds. Denies CP/SOB, calf pain

, f/c. No episodes of Afib recently.








Objective:


Vitals: 











Temp Pulse Resp BP Pulse Ox


 


 97.8 F   86   20   137/66   96 


 


 03/03/18 07:41  03/03/18 07:41  03/03/18 07:54  03/03/18 07:41  03/03/18 07:41








Gen: A&O x3, NAD at rest sitting in chair


LLE: Incision C/D/I with sutures. Mild edema and ecchymosis to knee, no 

hematoma. Calf soft, NT. +f/e at ankle and MTPs, N/V intact





Labs: 


 Laboratory Results - last 24 hr











  03/03/18 03/03/18





  05:31 05:31


 


Hgb  13.6 L 


 


Hct  39 L 


 


Plt Count  185 


 


MPV  8 


 


INR (Anticoag Therapy)   1.31 H











Assessment:


POD #2 Left TKA, doing well





Plan:


D/C home today after PT


INR 1.31 today


Coumadin 8mg 3/3, 8mg 3/4, VNS to redraw INR 3/5


F/U with Dr. King 10-14 days

## 2018-03-05 NOTE — DS
DISCHARGE SUMMARY:

 

DATE OF ADMISSION:  03/01/18

 

DATE OF DISCHARGE:  03/03/18

 

PROVIDER:  Dr. Mary Jane King* (dictated by PETR Samano).

 

ADMITTING DIAGNOSIS:  Severe end-stage osteoarthritis of the left knee.

 

DISCHARGE DIAGNOSIS:  Severe end-stage osteoarthritis of the left knee, status 
post left total knee arthroplasty.

 

SECONDARY DIAGNOSES:

1.  Hypertension.

2.  History of prostate cancer.

3.  Left bundle branch block.

4.  New-onset atrial fibrillation.

 

HISTORY OF PRESENT ILLNESS:  Mr. Garcia is a 67-year-old gentleman, who has had 
over 6 months of severe left knee pain.  He failed conservative treatment and 
elected to proceed with a left total knee arthroplasty.

 

HOSPITAL COURSE:  On 03/01/18, the patient was admitted to [facility] and underwent a successful left total knee arthroplasty by Dr. King.  
He recovered briefly in the postanesthesia care unit.  However, in the Recovery
, he was found to have atrial fibrillation with a rapid ventricular rate.  The 
hospitalist service and Cardiology was consulted on and the patient was 
observed postoperatively in the intensive care unit.  On postop day 1, heart 
rate remained regular with no further episodes of atrial fibrillation.  He was 
then found stable to be transferred to the short stay surgical unit.  His pain 
is controlled with oral pain medication.  He was able to participate with 
physical therapy and he ambulates with the use of a rolling walker.  On postop 
day 1, his H and H was 14.5 and 42.  INR is 0.97 with no previous Coumadin.  
Galvan catheter was removed on postop day 1 as well as Hemovac drain with no 
issues.  On postop day 2, the patient's H and H was 13.6 and 39.  INR 1.3 with 
10 mg of Coumadin previously.  The patient was again able to participate with 
physical therapy with the use of rolling walker to ambulate with minimal 
assistance.  The pain was well controlled with oral pain medication.  He was 
found stable for discharge home.

 

DISCHARGE INSTRUCTION:  The patient will keep his incision clean and dry until 
postop day 4.  At that time, he may shower normally.  He is understanding to 
avoid a bath tub, hot tub, or swimming pool, and submerging the incision.  He 
will apply a dry dressing as needed.  He will have home visiting nurse and 
physical therapy services for wound checks and INR draws as well as Coumadin 
dosing given his new onset of AFib.  He will be weightbearing as tolerated with 
a rolling walker.  He will follow up in the office 10 to 14 days 
postoperatively with Dr. King.  He is understanding to call the office with 
any problems or concerns.  He is understanding to go directly to the emergency 
room with any chest pain, shortness of breath, fevers greater than 101.5, calf 
pain or swelling.

 

DISCHARGE MEDICATIONS:  On discharge, medications will include:

 

1.  Percocet 5/325 one to two tabs p.o. q.4 to 6 hours p.r.n. pain.

2.  Coumadin as directed.  He will take 8 mg on 03/03/18.  He will take an 
additional 8 mg on 03/04/18.  He will hopefully have a repeat INR drawn on 03/05
/18 by visiting nurse.

3.  Colace 100 mg p.o. b.i.d.

4.  He will resume his home medications of losartan potassium 50 mg p.o. q.a.m.

5.  Metoprolol succinate XL increased to 75 mg p.o. q. day.

 

All of his questions were answered to his full satisfaction.  He is 
understanding to call with any problems or concerns.

 

____________________________________ 

PETR SAMANO

 

[id number]/[facility] #: [id number]

[employer]

## 2023-07-06 NOTE — PN
Subjective


Date of Service: 03/02/18


Interval History: 





Pt feels well, no more A. fib on telem. Left knee is "sore"





Objective


Active Medications: 








Acetaminophen (Tylenol Tab*)  650 mg PO Q4H PRN


   PRN Reason: PAIN OR TEMPERATURE


   Last Admin: 03/02/18 00:52 Dose:  650 mg


Bisacodyl (Dulcolax Supp*)  10 mg NV DAILY PRN


   PRN Reason: constipation


Cyclobenzaprine HCl (Flexeril Tab*)  10 mg PO TID PRN


   PRN Reason: SPASMS


Diphenhydramine HCl (Benadryl Po*)  25 mg PO Q6H PRN


   PRN Reason: itching


Docusate Sodium (Colace Cap*)  100 mg PO BID [facility]


   Last Admin: 03/02/18 08:02 Dose:  100 mg


Enoxaparin Sodium (Lovenox(*))  40 mg SUBCUT Q24H [facility]


   Last Admin: 03/02/18 08:02 Dose:  40 mg


Lactated Ringer's (Lactated Ringers 1000 Ml Bag*)  1,000 mls @ 100 mls/hr IV 

PER RATE [facility]


   Last Admin: 03/02/18 00:30 Dose:  100 mls/hr


Esmolol HCl (Brevibloc 10 Mg/Ml Ivpremix*)  2,500 mg in 250 mls @ 0 mls/hr IVPB 

.PER PARAMETERS MEL; Per Protocol


   PRN Reason: Protocol


   Last Admin: 03/01/18 14:12 Dose:  27 mls/hr


Cefazolin Sodium 1 gm/ Sodium (Chloride)  50 mls @ 200 mls/hr IVPB 0500,1300,

2100 [facility]


   Stop: 03/02/18 13:14


   Last Admin: 03/02/18 05:22 Dose:  200 mls/hr


Lactulose (Lactulose*)  30 ml PO Q6H PRN


   PRN Reason: constipation


Magnesium Hydroxide (Milk Of Magnesia Liq*)  30 ml PO BID [facility]


   Last Admin: 03/02/18 08:02 Dose:  30 ml


Magnesium Hydroxide (Milk Of Magnesia Liq*)  30 ml PO Q6H PRN


   PRN Reason: constipation


Metoprolol Succinate (Toprol Xl Tab*)  75 mg PO DAILY [facility]


   Last Admin: 03/02/18 08:03 Dose:  75 mg


Multivitamins (Theragran Tab*)  1 tab PO DAILY [facility]


   Last Admin: 03/02/18 08:02 Dose:  1 tab


Ondansetron HCl (Zofran Inj*)  4 mg IV Q6H PRN


   PRN Reason: nausea


Oxycodone HCl (Roxycodone Tab*)  10 mg PO Q4H PRN


   PRN Reason: PAIN - SEVERE


   Last Admin: 03/02/18 01:57 Dose:  10 mg


Oxycodone/Acetaminophen (Percocet 5/325 Tab*)  2 tab PO Q4H PRN


   PRN Reason: PAIN - MODERATE TO SEVERE


Oxycodone/Acetaminophen (Percocet 5/325 Tab*)  1 tab PO Q4H PRN


   PRN Reason: PAIN - MODERATE


   Last Admin: 03/02/18 08:02 Dose:  1 tab


Warfarin Sodium (Coumadin Tab(*))  10 mg PO ONCE@1700 ONE


   PRN Reason: Protocol


   Stop: 03/02/18 17:01








 Vital Signs - 8 hr











  03/02/18 03/02/18 03/02/18





  04:00 04:23 05:00


 


Temperature 99.1 F  


 


Pulse Rate 63  66


 


Respiratory 13  16





Rate   


 


Blood Pressure 102/56  





(mmHg)   


 


O2 Sat by Pulse 94 93 95





Oximetry   














  03/02/18 03/02/18 03/02/18





  05:12 06:00 07:00


 


Temperature   


 


Pulse Rate 69 60 66


 


Respiratory 25 14 14





Rate   


 


Blood Pressure 122/76 109/67 





(mmHg)   


 


O2 Sat by Pulse 96 95 97





Oximetry   














  03/02/18 03/02/18 03/02/18





  07:52 08:00 08:02


 


Temperature  98.2 F 


 


Pulse Rate 68 67 


 


Respiratory 19 17 18





Rate   


 


Blood Pressure 129/84 126/74 





(mmHg)   


 


O2 Sat by Pulse 94 96 





Oximetry   














  03/02/18 03/02/18 03/02/18





  09:00 09:01 09:54


 


Temperature   


 


Pulse Rate 70 67 73


 


Respiratory 21 17 21





Rate   


 


Blood Pressure  141/84 118/63





(mmHg)   


 


O2 Sat by Pulse 94 95 94





Oximetry   














  03/02/18





  10:00


 


Temperature 


 


Pulse Rate 77


 


Respiratory 22





Rate 


 


Blood Pressure 





(mmHg) 


 


O2 Sat by Pulse 92





Oximetry 











Oxygen Devices in Use Now: None


Appearance: 68 yo M in nAD, AAOx3


Eyes: No Scleral Icterus, PERRLA


Ears/Nose/Mouth/Throat: NL Teeth, Lips, Gums, Mucous Membranes Moist


Neck: NL Appearance and Movements; NL JVP, Trachea Midline


Respiratory: Symmetrical Chest Expansion and Respiratory Effort


Cardiovascular: NL Sounds; No Murmurs; No JVD, RRR


Abdominal: NL Sounds; No Tenderness; No Distention


Lymphatic: No Cervical Adenopathy


Extremities: No Edema, No Clubbing, Cyanosis


Skin: No Nodules or Sclerosis, - - left knee in postsurgical dressings-not 

removed


Neurological: Alert and Oriented x 3, NL Muscle Strength and Tone


Result Diagrams: 


 03/02/18 05:09





 03/02/18 05:09


Microbiology and Other Data: 


 Microbiology











 03/01/18 16:00 Nasal Screen MRSA (PCR)([facility]) - Final





 Nasal    Mrsa Not Detected














Assess/Plan/Problems-Billing


Assessment: 68 yo M with h/o cardiomyopathy, HTN, LBBB had a brief episode of 

post op a. fib after left knee surgery on 3/1/18 that resolved after starting 

Esmolol gtt.











- Patient Problems


(1) Atrial fibrillation


Comment: transient, post op, resolved after starting Esololol gtt.


appreciate cardiology consult


Will cont increased Toprol


cont telem monitorimg, transfer to [facility]


Echo shows EF 45%-unchanged   





(2) HTN (hypertension)


Comment: BP controlled, holding cozaar   





(3) Status post left knee replacement


Comment: as per ortho   





(4) DVT prophylaxis


Comment: lovenox   


Status and Disposition: 





medicine consult, will follow 98